# Patient Record
Sex: MALE | Race: OTHER
[De-identification: names, ages, dates, MRNs, and addresses within clinical notes are randomized per-mention and may not be internally consistent; named-entity substitution may affect disease eponyms.]

---

## 2019-09-10 ENCOUNTER — HOSPITAL ENCOUNTER (EMERGENCY)
Dept: HOSPITAL 56 - MW.ED | Age: 29
Discharge: HOME | End: 2019-09-10
Payer: COMMERCIAL

## 2019-09-10 DIAGNOSIS — J98.01: Primary | ICD-10-CM

## 2019-09-10 DIAGNOSIS — J40: ICD-10-CM

## 2019-09-10 DIAGNOSIS — Z88.0: ICD-10-CM

## 2019-09-10 NOTE — CR
Indication:



Cough for 1 month.



Technique:



Chest 2 views



Comparison:



None



Findings:



Cardiovascular and mediastinum:  Heart size and vasculature are normal in 

caliber and appearance. 



Lungs and pleural spaces:  Lungs are clear.  No sign of infiltrate or mass. 

 No sign of pleural effusion.  No pneumothorax.  



Bones and soft tissues:  No significant findings.



Impression:



Unremarkable two view chest.



Dictated by Alexander Conde MD @ Sep 10 2019 10:51PM



Signed by Dr. Alexander Conde @ Sep 10 2019 10:52PM

## 2019-09-10 NOTE — EDM.PDOC
ED HPI GENERAL MEDICAL PROBLEM





- General


Chief Complaint: Respiratory Problem


Stated Complaint: PT HAS COUGH


Time Seen by Provider: 09/10/19 22:14





- History of Present Illness


INITIAL COMMENTS - FREE TEXT/NARRATIVE: 





HISTORY AND PHYSICAL:





History of present illness:


The patient is a 29-year-old male who presents with a one-month history of 

cough. The patient says that when it started a month ago it was very harsh and 

productive of green and yellow phlegm and now it is more dry and had he only 

productive of clear and occasionally white phlegm. He never had a fever with 

this sore throat runny nose or sinus congestion and no chest pain or shortness 

of breath. He has had a couple of episodes of posttussive emesis when he coughs 

very hard but he is not vomiting regularly and has no abdominal complaints. He 

has not tried much over-the-counter for these symptoms and says that he 

schedule an appointment in the clinic but it was canceled today due to a 

computer problem. He does not smoke but he works at the [employer] and he is exposed 

to a lot of inmates and potential illnesses so he is more concerned. He was 

told that he had asthma as a child but he has never had an asthma attack that 

he recalls nor does he use an inhaler and he is normally an active person 

without any shortness of breath.





Review of systems: 


As per history of present illness and below otherwise all systems reviewed and 

negative.





Past medical history: 


As per history of present illness and as reviewed below otherwise 

noncontributory.





Surgical history: 


As per history of present illness and as reviewed below otherwise 

noncontributory.





Social history: 


No reported history of drug or alcohol abuse.





Family history: 


As per history of present illness and as reviewed below otherwise 

noncontributory.





Physical exam:


General: Well-developed well-nourished man who is nontoxic and vital signs are 

noted by me. He speaks clearly and easily in the ED without breathlessness voice


HEENT: Atraumatic, normocephalic, negative for conjunctival pallor or scleral 

icterus, mucous membranes moist, throat clear, neck supple, nontender, trachea 

midline.


Lungs: diminished breath sounds in all lung fields without any wheezing stridor 

or work of breathing breath sounds equal bilaterally, chest nontender.


Heart: S1S2, regular rate and rhythm no overt murmurs 


Abdomen: Soft, nondistended, nontender. NABS


Pelvis: Deferred 


Genitourinary: Deferred.


Rectal: Deferred.


Extremities: Atraumatic, no edema. Neurovascular unremarkable.


Neuro: Awake, alert, oriented. Cranial nerves II through XII unremarkable. 

Cerebellum unremarkable. Motor and sensory unremarkable throughout. Exam 

nonfocal.





Diagnostics:


Chest x-ray influenza swab 





Therapeutics:


DuoNeb  and spacer, with teaching 





After the DuoNeb the patient is moving air beautifully bilaterally which is 

dramatically different than his initial exam. He says he does feel somewhat 

better. I discussed with him that he will need a spacer and inhaler at home as 

well as a burst of steroids but will still need follow-up in the clinic.





Impression: 


Bronchitis with bronchospasm





Definitive disposition and diagnosis as appropriate pending reevaluation and 

review of above.


  ** no pain


Pain Score (Numeric/FACES): 0





- Related Data


 Allergies











Allergy/AdvReac Type Severity Reaction Status Date / Time


 


Penicillins Allergy  Airway Verified 09/10/19 22:08





   Tightness  











Home Meds: 


 Home Meds





. [No Known Home Meds]  09/10/19 [History]











Past Medical History





- Past Health History


Medical/Surgical History: Denies Medical/Surgical History


HEENT History: Reports: None


Cardiovascular History: Reports: None


Respiratory History: Reports: None


Gastrointestinal History: Reports: None


Genitourinary History: Reports: None


Musculoskeletal History: Reports: None


Neurological History: Reports: None


Endocrine/Metabolic History: Reports: None


Hematologic History: Reports: None


Oncologic (Cancer) History: Reports: None


Dermatologic History: Reports: None





- Infectious Disease History


Infectious Disease History: Reports: Chicken Pox





- Past Surgical History


Head Surgeries/Procedures: Reports: None





Social & Family History





- Family History


Family Medical History: Noncontributory





- Tobacco Use


Smoking Status *Q: Never Smoker





- Recreational Drug Use


Recreational Drug Use: No





ED ROS GENERAL





- Review of Systems


Review Of Systems: ROS reveals no pertinent complaints other than HPI.





ED EXAM, GENERAL





- Physical Exam


Exam: See Below (See dictation)





Course





- Vital Signs


Last Recorded V/S: 


 Last Vital Signs











Temp  36.3 C   09/10/19 22:08


 


Pulse  72   09/10/19 22:08


 


Resp  18   09/10/19 22:08


 


BP  140/93 H  09/10/19 22:08


 


Pulse Ox  97   09/10/19 22:08














- Orders/Labs/Meds


Orders: 


 Active Orders 24 hr











 Category Date Time Status


 


 RT Aerosol Therapy [RC] ASDIRECTED Care  09/10/19 22:20 Active











Meds: 


Medications














Discontinued Medications














Generic Name Dose Route Start Last Admin





  Trade Name Oral  PRN Reason Stop Dose Admin


 


Albuterol/Ipratropium  3 ml  09/10/19 22:20  09/10/19 22:30





  Duoneb 3.0-0.5 Mg/3 Ml  NEB  09/10/19 22:21  3 ml





  ONETIME ONE   Administration





     





     





     





     














Departure





- Departure


Time of Disposition: 23:03


Disposition: Home, Self-Care 01


Condition: Good


Clinical Impression: 


 Bronchitis with bronchospasm








- Discharge Information


Referrals: 


PCP,None [Primary Care Provider] - 


Forms:  ED Department Discharge


Additional Instructions: 


The following information is given to patients seen in the emergency department 

who are being discharged to home. This information is to outline your options 

for follow-up care. We provide all patients seen in our emergency department 

with a follow-up referral.





The need for follow-up, as well as the timing and circumstances, are variable 

depending upon the specifics of your emergency department visit.





If you don't have a primary care physician on staff, we will provide you with a 

referral. We always advise you to contact your personal physician following an 

emergency department visit to inform them of the circumstance of the visit and 

for follow-up with them and/or the need for any referrals to a consulting 

specialist.





The emergency department will also refer you to a specialist when appropriate. 

This referral assures that you have the opportunity for followup care with a 

specialist. All of these measure are taken in an effort to provide you with 

optimal care, which includes your followup.





Under all circumstances we always encourage you to contact your private 

physician who remains a resource for coordinating  your care. When calling for 

followup care, please make the office aware that this follow-up is from your 

recent emergency room visit. If for any reason you are refused follow-up, 

please contact the [facility] emergency 

department at [phone] and ask to speak to the emergency department 

charge nurse.





[facility] 


Primary care- [place] [address]


[phone]








Push hydration and call and schedule a follow-up appointment in the clinic to 

reevaluate today's care plan. Please use the Ventolin inhaler your been given 

with the spacer 1-2 puffs every 6 hours for the next 24 hours and then 1-2 

puffs every 6 hours as needed for cough or shortness of breath. Please take the 

prednisone you have been prescribed as well. Return to ER as needed and as 

discussed





- My Orders


Last 24 Hours: 


My Active Orders





09/10/19 22:20


RT Aerosol Therapy [RC] ASDIRECTED 














- Assessment/Plan


Last 24 Hours: 


My Active Orders





09/10/19 22:20


RT Aerosol Therapy [RC] ASDIRECTED